# Patient Record
Sex: FEMALE | Race: WHITE | NOT HISPANIC OR LATINO | Employment: STUDENT | URBAN - METROPOLITAN AREA
[De-identification: names, ages, dates, MRNs, and addresses within clinical notes are randomized per-mention and may not be internally consistent; named-entity substitution may affect disease eponyms.]

---
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
## 2025-03-03 NOTE — PROGRESS NOTES
"Name: Sarah Suarez      : 2005      MRN: [id number]  Encounter Provider: MONIKA Wilkins  Encounter Date: 3/4/2025   Encounter department: [facility]  :  Assessment & Plan  Encounter for surveillance of contraceptive pills  Reviewed take the pill at the same time every day.  Missed pill protocol reviewed.  Written information provided  Reviewed OCPs will help protect from pregnancy will not protect from STDs.  Encouraged consistent condom use with sexual debut  Orders:    norgestimate-ethinyl estradiol (Sprintec 28) 0.25-35 MG-MCG per tablet; Take 1 tablet by mouth daily    RTO  1 year for annual exam     History of Present Illness   HPI  Sarah Suarez is a 20 y.o. female who presents for OCP check. LMP 2/10/25   Menses are monthly. Denies side effects and missing pills. ACNE has improved. Is satisfied and desires to continue.     Pap smear NA   Completed gardasil vaccine     History obtained from: patient    Review of Systems   Constitutional:  Negative for chills and fever.   Respiratory: Negative.     Cardiovascular: Negative.    Genitourinary: Negative.      Medical History Reviewed by provider this encounter:  Allergies  Meds  Problems     .     Objective   /84   Ht 5' 5\" (1.651 m)   Wt 49.4 kg (109 lb)   LMP 02/10/2025   BMI 18.14 kg/m²      Physical Exam  Constitutional:       Appearance: Normal appearance.   Neurological:      Mental Status: She is alert and oriented to person, place, and time.   Psychiatric:         Mood and Affect: Mood normal.         Behavior: Behavior normal.           "

## 2025-03-04 ENCOUNTER — OFFICE VISIT (OUTPATIENT)
Dept: OBGYN CLINIC | Facility: CLINIC | Age: 20
End: 2025-03-04
Payer: COMMERCIAL

## 2025-03-04 VITALS
BODY MASS INDEX: 18.16 KG/M2 | HEIGHT: 65 IN | DIASTOLIC BLOOD PRESSURE: 84 MMHG | WEIGHT: 109 LBS | SYSTOLIC BLOOD PRESSURE: 122 MMHG

## 2025-03-04 DIAGNOSIS — Z30.41 ENCOUNTER FOR SURVEILLANCE OF CONTRACEPTIVE PILLS: Primary | ICD-10-CM

## 2025-03-04 PROBLEM — M25.551 RIGHT HIP PAIN: Status: RESOLVED | Noted: 2024-08-21 | Resolved: 2025-03-04

## 2025-03-04 PROCEDURE — 99213 OFFICE O/P EST LOW 20 MIN: CPT | Performed by: NURSE PRACTITIONER

## 2025-03-04 RX ORDER — TRETINOIN 0.25 MG/G
CREAM TOPICAL
COMMUNITY
Start: 2025-03-03

## 2025-03-04 RX ORDER — AZELAIC ACID 0.15 G/G
GEL TOPICAL
COMMUNITY
Start: 2025-03-03

## 2025-03-04 RX ORDER — NORGESTIMATE AND ETHINYL ESTRADIOL 0.25-0.035
1 KIT ORAL DAILY
Qty: 84 TABLET | Refills: 3 | Status: SHIPPED | OUTPATIENT
Start: 2025-03-04

## 2025-03-04 NOTE — ASSESSMENT & PLAN NOTE
Reviewed take the pill at the same time every day.  Missed pill protocol reviewed.  Written information provided  Reviewed OCPs will help protect from pregnancy will not protect from STDs.  Encouraged consistent condom use with sexual debut  Orders:    norgestimate-ethinyl estradiol (Sprintec 28) 0.25-35 MG-MCG per tablet; Take 1 tablet by mouth daily

## 2025-05-02 ENCOUNTER — APPOINTMENT (OUTPATIENT)
Dept: LAB | Facility: CLINIC | Age: 20
End: 2025-05-02

## 2025-05-02 DIAGNOSIS — Z11.1 TUBERCULOSIS SCREENING: ICD-10-CM

## 2025-05-02 PROCEDURE — 36415 COLL VENOUS BLD VENIPUNCTURE: CPT

## 2025-05-02 PROCEDURE — 86480 TB TEST CELL IMMUN MEASURE: CPT

## 2025-05-03 LAB
GAMMA INTERFERON BACKGROUND BLD IA-ACNC: 0.05 IU/ML
M TB IFN-G BLD-IMP: NEGATIVE
M TB IFN-G CD4+ BCKGRND COR BLD-ACNC: 0.11 IU/ML
M TB IFN-G CD4+ BCKGRND COR BLD-ACNC: 0.12 IU/ML
MITOGEN IGNF BCKGRD COR BLD-ACNC: 9.95 IU/ML